# Patient Record
Sex: MALE | Employment: FULL TIME | URBAN - METROPOLITAN AREA
[De-identification: names, ages, dates, MRNs, and addresses within clinical notes are randomized per-mention and may not be internally consistent; named-entity substitution may affect disease eponyms.]

---

## 2021-08-26 ENCOUNTER — OFFICE VISIT (OUTPATIENT)
Dept: URGENT CARE | Facility: URGENT CARE | Age: 56
End: 2021-08-26
Payer: COMMERCIAL

## 2021-08-26 VITALS
HEART RATE: 68 BPM | SYSTOLIC BLOOD PRESSURE: 137 MMHG | DIASTOLIC BLOOD PRESSURE: 82 MMHG | TEMPERATURE: 97.7 F | OXYGEN SATURATION: 100 %

## 2021-08-26 DIAGNOSIS — B35.1 ONYCHOMYCOSIS: ICD-10-CM

## 2021-08-26 DIAGNOSIS — B35.4 TINEA CORPORIS: Primary | ICD-10-CM

## 2021-08-26 PROCEDURE — 99204 OFFICE O/P NEW MOD 45 MIN: CPT | Performed by: NURSE PRACTITIONER

## 2021-08-26 RX ORDER — KETOCONAZOLE 20 MG/G
CREAM TOPICAL DAILY
Qty: 30 G | Refills: 0 | Status: SHIPPED | OUTPATIENT
Start: 2021-08-26 | End: 2021-09-09

## 2021-08-26 RX ORDER — TERBINAFINE HYDROCHLORIDE 250 MG/1
250 TABLET ORAL DAILY
Qty: 90 TABLET | Refills: 0 | Status: SHIPPED | OUTPATIENT
Start: 2021-08-26 | End: 2021-11-24

## 2021-08-26 ASSESSMENT — ENCOUNTER SYMPTOMS
NAUSEA: 0
DIARRHEA: 0
VOMITING: 0
SHORTNESS OF BREATH: 0
SORE THROAT: 0
COUGH: 0
DIAPHORESIS: 0
CHILLS: 0
FEVER: 0
RHINORRHEA: 0

## 2021-08-26 NOTE — PROGRESS NOTES
SUBJECTIVE:   Charity Zarate is a 56 year old male presenting with a chief complaint of   Chief Complaint   Patient presents with     Derm Problem     Rash on tailbone, had itching about a month ago, went away, now he has a rash.        He is a new patient of [place].    Rash    Onset of rash was 1 month(s) ago.   Course of illness is worsening.  Severity moderate  Current and Associated symptoms: itching, dry and rough skin, also has hardening of both big toes and are turning yellow   Location of the rash: on the butt crack.  Previous history of a similar rash? No  Recent exposure history: none known  Denies exposure to: none known  Associated symptoms include: nothing.  Treatment measures tried include: otc hydrocortisone cream        Review of Systems   Constitutional: Negative for chills, diaphoresis and fever.   HENT: Negative for congestion, ear pain, rhinorrhea and sore throat.    Respiratory: Negative for cough and shortness of breath.    Gastrointestinal: Negative for diarrhea, nausea and vomiting.   Skin: Positive for rash.        hardening and yellowing of nails of both big toes   All other systems reviewed and are negative.      No past medical history on file.  No family history on file.  Current Outpatient Medications   Medication Sig Dispense Refill     ketoconazole (NIZORAL) 2 % external cream Apply topically daily for 14 days 30 g 0     terbinafine (LAMISIL) 250 MG tablet Take 1 tablet (250 mg) by mouth daily 90 tablet 0     Social History     Tobacco Use     Smoking status: Not on file   Substance Use Topics     Alcohol use: Not on file       OBJECTIVE  /82   Pulse 68   Temp 97.7  F (36.5  C) (Tympanic)   SpO2 100%     Physical Exam  Vitals and nursing note reviewed.   Constitutional:       General: He is not in acute distress.     Appearance: He is well-developed. He is not diaphoretic.   HENT:      Head: Normocephalic and atraumatic.      Right Ear: Tympanic membrane and external ear  normal.      Left Ear: Tympanic membrane and external ear normal.   Eyes:      Pupils: Pupils are equal, round, and reactive to light.   Pulmonary:      Effort: Pulmonary effort is normal. No respiratory distress.      Breath sounds: Normal breath sounds.   Musculoskeletal:      Cervical back: Normal range of motion and neck supple.   Lymphadenopathy:      Cervical: No cervical adenopathy.   Skin:     General: Skin is warm and dry.      Comments: Erythematous rash with flaking and scaling with central clearing on the crack of buttocks.  Hardening and yellowing of toenails of both big toes.   Neurological:      Mental Status: He is alert.      Cranial Nerves: No cranial nerve deficit.       ASSESSMENT:      ICD-10-CM    1. Tinea corporis  B35.4 ketoconazole (NIZORAL) 2 % external cream   2. Onychomycosis  B35.1 terbinafine (LAMISIL) 250 MG tablet      PLAN:  Patient educational/instructional material provided including reasons for follow-up    The patient indicates understanding of these issues and agrees with the plan.    Patient Instructions     Patient Education     Fungal Skin Infection (Tinea)  A fungal infection occurs when too much fungus grows on or in the body. Fungus normally lives on the skin in small amounts and does not cause harm. But when too much grows on the skin, it causes an infection. This is also known as tinea. Fungal skin infections are common and not usually serious.   The infection often starts as a small red area the size of a pea. The skin may turn dry and flaky. The area may itch. As the fungus grows, it spreads out in a red [place]. Because of how it looks, fungal skin infection is often called ringworm, but it is not caused by a worm. Fungal skin infections can occur on many parts of the body. They can grow on the head, chest, arms, buttocks or legs. On the feet, fungal infection is known as  athlete s foot.  It causes itchy, sometimes painful sores between the toes and the bottom or  sides of the feet. In the groin, the rash is called  jock itch.    People with weak immune systems can get a fungal infection more easily. This includes people with diabetes or HIV, or who are being treated for cancer. In these cases, the fungal infection can spread and cause severe illness. Fungal infections are also more common in people who are overweight.   In most cases, treatment is done with antifungal cream or ointment. If the infection is on your scalp, you will need to take oral medicine. To confirm the diagnosis of a fungal infection, the healthcare provider may take a small scraping of the skin to be tested in a lab.   Common fungal infections are treated with creams on the skin or oral medicine.  Home care  Follow all instructions when using antifungal cream or ointment on your skin.   General care:    If you were prescribed an oral medicine, read the patient information. Talk with your healthcare provider about the risks and side effects.    Let your skin dry completely after bathing. Carefully dry your feet and between your toes.    Dress in loose cotton clothing.    Don t scratch the affected area. This can delay healing and may spread the infection. It can also cause a bacterial infection.    Keep your skin clean, but don t wash the skin too much. This can irritate your skin.    Keep in mind that it may take a week before the fungus starts to go away. It can take 2 to 4 weeks to fully clear. To prevent it from coming back, use the medicine until the rash is all gone.  Follow-up care  Follow up with your healthcare provider if the rash does not get better after 10 days of treatment. Also follow up if the rash spreads to other parts of your body.   When to seek medical advice  Call your healthcare provider right away if any of these occur:    Fever of 100.4 F (38 C) or higher, or as directed by your healthcare provider    Redness or swelling that gets worse    Pain that gets worse    Foul-smelling  fluid leaking from the skin  [employer] last reviewed this educational content on 8/1/2019 2000-2021 [employer]. All rights reserved. This information is not intended as a substitute for professional medical care. Always follow your healthcare professional's instructions.

## 2021-08-26 NOTE — PATIENT INSTRUCTIONS
Patient Education     Fungal Skin Infection (Tinea)  A fungal infection occurs when too much fungus grows on or in the body. Fungus normally lives on the skin in small amounts and does not cause harm. But when too much grows on the skin, it causes an infection. This is also known as tinea. Fungal skin infections are common and not usually serious.   The infection often starts as a small red area the size of a pea. The skin may turn dry and flaky. The area may itch. As the fungus grows, it spreads out in a red [place]. Because of how it looks, fungal skin infection is often called ringworm, but it is not caused by a worm. Fungal skin infections can occur on many parts of the body. They can grow on the head, chest, arms, buttocks or legs. On the feet, fungal infection is known as  athlete s foot.  It causes itchy, sometimes painful sores between the toes and the bottom or sides of the feet. In the groin, the rash is called  jock itch.    People with weak immune systems can get a fungal infection more easily. This includes people with diabetes or HIV, or who are being treated for cancer. In these cases, the fungal infection can spread and cause severe illness. Fungal infections are also more common in people who are overweight.   In most cases, treatment is done with antifungal cream or ointment. If the infection is on your scalp, you will need to take oral medicine. To confirm the diagnosis of a fungal infection, the healthcare provider may take a small scraping of the skin to be tested in a lab.   Common fungal infections are treated with creams on the skin or oral medicine.  Home care  Follow all instructions when using antifungal cream or ointment on your skin.   General care:    If you were prescribed an oral medicine, read the patient information. Talk with your healthcare provider about the risks and side effects.    Let your skin dry completely after bathing. Carefully dry your feet and between your  toes.    Dress in loose cotton clothing.    Don t scratch the affected area. This can delay healing and may spread the infection. It can also cause a bacterial infection.    Keep your skin clean, but don t wash the skin too much. This can irritate your skin.    Keep in mind that it may take a week before the fungus starts to go away. It can take 2 to 4 weeks to fully clear. To prevent it from coming back, use the medicine until the rash is all gone.  Follow-up care  Follow up with your healthcare provider if the rash does not get better after 10 days of treatment. Also follow up if the rash spreads to other parts of your body.   When to seek medical advice  Call your healthcare provider right away if any of these occur:    Fever of 100.4 F (38 C) or higher, or as directed by your healthcare provider    Redness or swelling that gets worse    Pain that gets worse    Foul-smelling fluid leaking from the skin  [employer] last reviewed this educational content on 8/1/2019 2000-2021 [employer]. All rights reserved. This information is not intended as a substitute for professional medical care. Always follow your healthcare professional's instructions.